# Patient Record
Sex: FEMALE | Race: BLACK OR AFRICAN AMERICAN | NOT HISPANIC OR LATINO | Employment: UNEMPLOYED | URBAN - METROPOLITAN AREA
[De-identification: names, ages, dates, MRNs, and addresses within clinical notes are randomized per-mention and may not be internally consistent; named-entity substitution may affect disease eponyms.]

---

## 2019-12-16 PROBLEM — R87.613 HSIL ON PAP SMEAR OF CERVIX: Status: ACTIVE | Noted: 2019-12-16

## 2019-12-16 PROBLEM — N93.0 PCB (POST COITAL BLEEDING): Status: ACTIVE | Noted: 2019-12-16

## 2020-01-02 PROBLEM — E66.01 CLASS 3 SEVERE OBESITY WITH BODY MASS INDEX (BMI) OF 40.0 TO 44.9 IN ADULT: Status: ACTIVE | Noted: 2020-01-02

## 2020-01-02 PROBLEM — G43.109 MIGRAINE WITH AURA AND WITHOUT STATUS MIGRAINOSUS, NOT INTRACTABLE: Status: ACTIVE | Noted: 2020-01-02

## 2020-01-02 PROBLEM — E66.813 CLASS 3 SEVERE OBESITY WITH BODY MASS INDEX (BMI) OF 40.0 TO 44.9 IN ADULT: Status: ACTIVE | Noted: 2020-01-02

## 2020-01-02 PROBLEM — E55.9 VITAMIN D DEFICIENCY: Status: ACTIVE | Noted: 2020-01-02

## 2020-01-08 PROBLEM — D06.9 CERVICAL INTRAEPITHELIAL NEOPLASIA GRADE 3: Status: ACTIVE | Noted: 2020-01-08

## 2020-01-23 PROBLEM — Z98.890 S/P LEEP: Status: ACTIVE | Noted: 2020-01-23

## 2022-01-20 ENCOUNTER — SOCIAL WORK (OUTPATIENT)
Dept: ADMINISTRATIVE | Facility: OTHER | Age: 32
End: 2022-01-20

## 2022-01-20 NOTE — PROGRESS NOTES
SW met with pt regarding initial OB assessment. Pt stated this is her 2nd pregnancy/0-miscarriage. Pt stated lives with her grandfather/child-6 and able to perform ADL's independently. Pt stated does work. Pt stated support system is her boyfriend/Ramirez. Pt stated has medicaid([facility]). Pt stated does not have WIC. Pt stated is going to breastfeed. SW provide pt with information on other community resources.SW faxed and scanned pt's notification of pregnancy into epic.  No other needs identified at this time.    Sara Beyer,MSW  Pager#[phone]

## 2022-01-21 PROBLEM — Z34.91 FIRST TRIMESTER PREGNANCY: Status: ACTIVE | Noted: 2022-01-21

## 2022-01-21 PROBLEM — K92.0 HEMATEMESIS WITH NAUSEA: Status: RESOLVED | Noted: 2022-01-21 | Resolved: 2022-01-21

## 2022-01-21 PROBLEM — I10 CHRONIC HYPERTENSION: Status: ACTIVE | Noted: 2022-01-21

## 2022-01-21 PROBLEM — O21.9 NAUSEA AND VOMITING DURING PREGNANCY: Status: ACTIVE | Noted: 2022-01-21

## 2022-01-21 PROBLEM — K92.0 HEMATEMESIS WITH NAUSEA: Status: ACTIVE | Noted: 2022-01-21

## 2022-01-23 PROBLEM — O23.41 URINARY TRACT INFECTION IN MOTHER DURING FIRST TRIMESTER OF PREGNANCY: Status: ACTIVE | Noted: 2022-01-23

## 2022-01-31 PROBLEM — R74.01 TRANSAMINITIS: Status: ACTIVE | Noted: 2022-01-31

## 2022-02-07 PROBLEM — N93.0 PCB (POST COITAL BLEEDING): Status: RESOLVED | Noted: 2019-12-16 | Resolved: 2022-02-07

## 2022-02-08 PROBLEM — K11.7 PTYALISM: Status: ACTIVE | Noted: 2022-02-08

## 2022-02-10 ENCOUNTER — PATIENT MESSAGE (OUTPATIENT)
Dept: ADMINISTRATIVE | Facility: OTHER | Age: 32
End: 2022-02-10

## 2022-02-10 PROBLEM — E87.6 HYPOKALEMIA: Status: ACTIVE | Noted: 2022-02-10

## 2022-02-10 PROBLEM — E83.42 HYPOMAGNESEMIA: Status: ACTIVE | Noted: 2022-02-10

## 2022-02-10 PROBLEM — E83.39 HYPOPHOSPHATASIA: Status: ACTIVE | Noted: 2022-02-10

## 2022-02-17 ENCOUNTER — PATIENT MESSAGE (OUTPATIENT)
Dept: ADMINISTRATIVE | Facility: OTHER | Age: 32
End: 2022-02-17

## 2022-02-17 PROBLEM — Z90.49 S/P LAPAROSCOPIC CHOLECYSTECTOMY: Status: ACTIVE | Noted: 2022-02-17

## 2022-02-22 PROBLEM — O09.92 SUPERVISION OF HIGH RISK PREGNANCY IN SECOND TRIMESTER: Status: ACTIVE | Noted: 2022-02-22

## 2022-02-26 PROBLEM — R11.2 NAUSEA AND VOMITING: Status: ACTIVE | Noted: 2022-01-21

## 2022-03-03 PROBLEM — Z34.92 PREGNANT AND NOT YET DELIVERED IN SECOND TRIMESTER: Status: ACTIVE | Noted: 2022-02-22

## 2022-03-03 PROBLEM — Z3A.15 15 WEEKS GESTATION OF PREGNANCY: Status: ACTIVE | Noted: 2022-03-03

## 2022-03-29 PROBLEM — Z3A.18 18 WEEKS GESTATION OF PREGNANCY: Status: ACTIVE | Noted: 2022-03-03

## 2022-03-29 PROBLEM — Z34.91 FIRST TRIMESTER PREGNANCY: Status: RESOLVED | Noted: 2022-01-21 | Resolved: 2022-03-29

## 2022-04-19 PROBLEM — O09.92 ENCOUNTER FOR SUPERVISION OF HIGH RISK PREGNANCY IN SECOND TRIMESTER, ANTEPARTUM: Status: ACTIVE | Noted: 2022-04-19

## 2022-05-02 PROBLEM — O46.92 VAGINAL BLEEDING IN PREGNANCY, SECOND TRIMESTER: Status: ACTIVE | Noted: 2022-05-02

## 2022-05-02 PROBLEM — O20.0 THREATENED ABORTION IN FIRST TRIMESTER: Status: ACTIVE | Noted: 2022-05-02

## 2022-05-03 PROBLEM — K92.0 HEMATEMESIS: Status: RESOLVED | Noted: 2022-01-21 | Resolved: 2022-05-03

## 2022-05-03 PROBLEM — K11.7 PTYALISM: Status: RESOLVED | Noted: 2022-02-08 | Resolved: 2022-05-03

## 2022-05-03 PROBLEM — R74.01 TRANSAMINITIS: Status: RESOLVED | Noted: 2022-01-31 | Resolved: 2022-05-03

## 2022-05-03 PROBLEM — D64.9 ANEMIA: Status: ACTIVE | Noted: 2022-05-03

## 2022-05-03 PROBLEM — E83.42 HYPOMAGNESEMIA: Status: RESOLVED | Noted: 2022-02-10 | Resolved: 2022-05-03

## 2022-05-03 PROBLEM — E83.39 HYPOPHOSPHATEMIA: Status: RESOLVED | Noted: 2022-02-10 | Resolved: 2022-05-03

## 2022-05-03 PROBLEM — E87.6 HYPOKALEMIA: Status: RESOLVED | Noted: 2022-02-10 | Resolved: 2022-05-03

## 2022-05-03 PROBLEM — O99.012 ANEMIA DURING PREGNANCY IN SECOND TRIMESTER: Status: ACTIVE | Noted: 2022-05-03

## 2022-05-15 PROBLEM — O10.919 CHRONIC HYPERTENSION IN PREGNANCY: Status: ACTIVE | Noted: 2022-01-21

## 2022-06-14 PROBLEM — O09.93 HIGH-RISK PREGNANCY IN THIRD TRIMESTER: Status: ACTIVE | Noted: 2022-04-19

## 2022-07-05 PROBLEM — O23.43 URINARY TRACT INFECTION IN MOTHER DURING THIRD TRIMESTER OF PREGNANCY: Status: ACTIVE | Noted: 2022-01-23

## 2022-08-12 PROBLEM — Z30.09 UNWANTED FERTILITY: Status: ACTIVE | Noted: 2022-08-12

## 2022-10-03 PROBLEM — O21.9 NAUSEA/VOMITING IN PREGNANCY: Status: RESOLVED | Noted: 2022-01-21 | Resolved: 2022-10-03

## 2022-10-03 PROBLEM — O09.93 SUPERVISION OF HIGH RISK PREGNANCY IN THIRD TRIMESTER: Status: RESOLVED | Noted: 2022-04-19 | Resolved: 2022-10-03

## 2022-10-03 PROBLEM — O46.92 VAGINAL BLEEDING IN PREGNANCY, SECOND TRIMESTER: Status: RESOLVED | Noted: 2022-05-02 | Resolved: 2022-10-03

## 2022-10-03 PROBLEM — O99.012 ANEMIA DURING PREGNANCY IN SECOND TRIMESTER: Status: RESOLVED | Noted: 2022-05-03 | Resolved: 2022-10-03

## 2022-10-03 PROBLEM — R03.0 ELEVATED BLOOD PRESSURE READING: Status: ACTIVE | Noted: 2022-10-03

## 2022-11-14 PROBLEM — O23.43 RECURRENT URINARY TRACT INFECTION AFFECTING PREGNANCY IN THIRD TRIMESTER: Status: RESOLVED | Noted: 2022-01-23 | Resolved: 2022-11-14

## 2024-11-05 PROBLEM — O26.891 PREGNANCY HEADACHE IN FIRST TRIMESTER: Status: ACTIVE | Noted: 2024-11-05

## 2024-11-05 PROBLEM — R51.9 PREGNANCY HEADACHE IN FIRST TRIMESTER: Status: ACTIVE | Noted: 2024-11-05

## 2024-11-06 PROBLEM — R03.0 ELEVATED BLOOD PRESSURE READING: Status: RESOLVED | Noted: 2022-10-03 | Resolved: 2024-11-06

## 2024-11-06 PROBLEM — Z30.09 UNWANTED FERTILITY: Status: RESOLVED | Noted: 2022-08-12 | Resolved: 2024-11-06

## 2024-12-04 PROBLEM — O10.919 CHRONIC HYPERTENSION AFFECTING PREGNANCY: Status: ACTIVE | Noted: 2024-12-04

## 2024-12-06 PROBLEM — J06.9 URI WITH COUGH AND CONGESTION: Status: ACTIVE | Noted: 2024-12-06

## 2024-12-09 PROBLEM — O09.91 SUPERVISION OF HIGH RISK PREGNANCY IN FIRST TRIMESTER: Status: ACTIVE | Noted: 2024-12-09

## 2024-12-11 ENCOUNTER — PATIENT MESSAGE (OUTPATIENT)
Dept: ADMINISTRATIVE | Facility: OTHER | Age: 34
End: 2024-12-11

## 2024-12-13 PROBLEM — O10.919 CHRONIC HYPERTENSION AFFECTING PREGNANCY: Status: RESOLVED | Noted: 2024-12-04 | Resolved: 2024-12-13

## 2024-12-13 PROBLEM — J10.1 INFLUENZA A: Status: ACTIVE | Noted: 2024-12-13

## 2024-12-13 PROBLEM — R51.9 PREGNANCY HEADACHE IN FIRST TRIMESTER: Status: RESOLVED | Noted: 2024-11-05 | Resolved: 2024-12-13

## 2024-12-13 PROBLEM — J06.9 URI WITH COUGH AND CONGESTION: Status: RESOLVED | Noted: 2024-12-06 | Resolved: 2024-12-13

## 2024-12-13 PROBLEM — O26.891 PREGNANCY HEADACHE IN FIRST TRIMESTER: Status: RESOLVED | Noted: 2024-11-05 | Resolved: 2024-12-13

## 2024-12-18 ENCOUNTER — PATIENT MESSAGE (OUTPATIENT)
Dept: ADMINISTRATIVE | Facility: OTHER | Age: 34
End: 2024-12-18

## 2024-12-23 PROBLEM — O21.0 HYPEREMESIS AFFECTING PREGNANCY, ANTEPARTUM: Status: ACTIVE | Noted: 2024-12-23
